# Patient Record
Sex: FEMALE | Race: WHITE | NOT HISPANIC OR LATINO
[De-identification: names, ages, dates, MRNs, and addresses within clinical notes are randomized per-mention and may not be internally consistent; named-entity substitution may affect disease eponyms.]

---

## 2018-01-31 ENCOUNTER — APPOINTMENT (OUTPATIENT)
Dept: OTOLARYNGOLOGY | Facility: CLINIC | Age: 68
End: 2018-01-31

## 2018-03-05 ENCOUNTER — APPOINTMENT (OUTPATIENT)
Dept: OTOLARYNGOLOGY | Facility: CLINIC | Age: 68
End: 2018-03-05
Payer: MEDICARE

## 2018-03-05 VITALS
OXYGEN SATURATION: 100 % | SYSTOLIC BLOOD PRESSURE: 148 MMHG | HEART RATE: 78 BPM | TEMPERATURE: 97.7 F | DIASTOLIC BLOOD PRESSURE: 71 MMHG

## 2018-03-05 PROCEDURE — 31575 DIAGNOSTIC LARYNGOSCOPY: CPT

## 2018-03-05 PROCEDURE — 99214 OFFICE O/P EST MOD 30 MIN: CPT | Mod: 25

## 2018-09-03 ENCOUNTER — FORM ENCOUNTER (OUTPATIENT)
Age: 68
End: 2018-09-03

## 2018-09-04 ENCOUNTER — APPOINTMENT (OUTPATIENT)
Dept: MRI IMAGING | Facility: CLINIC | Age: 68
End: 2018-09-04
Payer: MEDICARE

## 2018-09-04 ENCOUNTER — OUTPATIENT (OUTPATIENT)
Dept: OUTPATIENT SERVICES | Facility: HOSPITAL | Age: 68
LOS: 1 days | End: 2018-09-04

## 2018-09-04 PROCEDURE — 70543 MRI ORBT/FAC/NCK W/O &W/DYE: CPT | Mod: 26

## 2018-09-04 PROCEDURE — 71552 MRI CHEST W/O & W/DYE: CPT | Mod: 26

## 2019-07-30 ENCOUNTER — APPOINTMENT (OUTPATIENT)
Dept: OTOLARYNGOLOGY | Facility: CLINIC | Age: 69
End: 2019-07-30
Payer: MEDICARE

## 2019-07-30 VITALS
SYSTOLIC BLOOD PRESSURE: 177 MMHG | TEMPERATURE: 98 F | OXYGEN SATURATION: 100 % | RESPIRATION RATE: 16 BRPM | DIASTOLIC BLOOD PRESSURE: 80 MMHG | HEART RATE: 85 BPM

## 2019-07-30 DIAGNOSIS — H61.22 IMPACTED CERUMEN, LEFT EAR: ICD-10-CM

## 2019-07-30 PROCEDURE — 99214 OFFICE O/P EST MOD 30 MIN: CPT | Mod: 25

## 2019-07-30 PROCEDURE — 31575 DIAGNOSTIC LARYNGOSCOPY: CPT

## 2019-07-30 PROCEDURE — G0268 REMOVAL OF IMPACTED WAX MD: CPT | Mod: LT

## 2019-07-30 NOTE — CONSULT LETTER
[Please see my note below.] : Please see my note below. [Dear  ___] : Dear  [unfilled], [Consult Letter:] : I had the pleasure of evaluating your patient, [unfilled]. [Sincerely,] : Sincerely, [Consult Closing:] : Thank you very much for allowing me to participate in the care of this patient.  If you have any questions, please do not hesitate to contact me. [Director, [facility]roid [facility]] : Director, [facility] [Barak Wells M.D., FACS, [facility]] : Barak Wells M.D., FACS, [facility] [[facility]] : [facility] [Certified in Neck Ultrasound/ ECNU & AIUM] : Certified in Neck Ultrasound/ ECNU & AIUM [[facility]] : [facility]  [] :  [Otolaryngology/Head & Neck Surgery] : Otolaryngology/Head & Neck Surgery [[facility]] : [facility]

## 2019-07-30 NOTE — PROCEDURE
[Topical Lidocaine] : topical lidocaine [Hoarseness] : hoarseness not clearly evaluated by indirect laryngoscopy [Image(s) Captured] : image(s) captured and filed [Serial Number: ___] : Serial Number: [unfilled] [Flexible Endoscope] : examined with the flexible endoscope [Cerumen Impaction] : Cerumen Impaction [Same] : same as the Pre Op Dx. [] : Removal of Cerumen [Lesion(s)] : no lesions [de-identified] : The nasal septum is minimally deviated to the left. There are no masses or polyps and the nasal mucosa and secretions are normal. The choanae and posterior nasopharynx are normal without masses or drainage. The Eustachian tube orifices appear patent. The pharynx, including the posterior and lateral pharyngeal walls, the vallecula and base of tongue are normal without ulcerations, lesions or masses. The hypopharynx including the pyriform sinuses open well without pooling of secretions, mucosal lesions or masses. The supraglottic larynx including the epiglottis, petiole, glossoepiglottic folds and pharyngoepiglottic folds are normal without mucosal lesions, ulcerations or masses. The glottis reveals normal false vocal folds. The true vocal folds are glistening white, tense and of equal length, without paralysis, having symmetric mobility on adduction and abduction. There is mild chronic lymphedema. There are no mucosal lesions, nodules, cysts, erythroplasia or leukoplakia. The posterior cricoid area has healthy pink mucosa in the interarytenoid area and esophageal inlet. There is drying and mild thickening/edema of the interarytenoid mucosa  suggestive of posterior laryngitis from laryngopharyngeal acid reflux disease. The trachea is clear in the subglottic region without narrowing, deviation or lesions. \par  [de-identified] : GERD, squamous cell cancer of neck [FreeTextEntry1] : Clicking and fullness AD x 2 weeks [FreeTextEntry2] : left ear  [FreeTextEntry6] : cerumen removed AS with suction.  TM is normal.

## 2019-07-30 NOTE — HISTORY OF PRESENT ILLNESS
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   She had a basal cell cancer removed from her left neck ~ 1.5 years ago (Dermalogist is Lenin Mike).  She also was hospitalized in [place] 4 years ago for hypothyroidism after her PCP had lowered her dose for LT4 to 88 mcg.  She recovered after increasing the dose again to Synthroid brand 100 mcg and LT3 5 mcg daily. Now her weight is stable and she feels well on 100 mcg x 3 days and 88 mcgs x 4 days and Cytomel 5 mcg daily.  She still has intermittent TMJ pain. She has improved bone density on Vitamin D3 1,000 IU and exercise. She finished dental implants and is healing well.  She has chronic mild nasal congestion intermittently that often responds to Flonase.   She denies any new neck masses, neck pain or pressure.  She does still have a dry mouth, intermittent dysphonia and mild dysphagia.  She no longer has GERD or reflux of food on gluten free diet.  She no longer has hives.  Her Dermatologist has been treating her skin cancers. Her carotid Doppler is stable and she has not had TIAs on ASA 81 mg daily and Statins. Last January she was hospitalized over night for hypokalemia that was treated.  Her left ear feels full and more HL now and clicking intermittently. She denies tinnitus, vertigo or discharge.  She denies fever or chills.  [de-identified] : Ivonne is a 64 year-old female who was treated in 1996 with a right modified radical neck dissection a total thyroidectomy and bilateral external beam radiation therapy for an unknown primary squamous cell carcinoma. She was found to be hyperthyroid at that time.  In 2012 she was found to have high grade carotid disease on Doppler and is taking ASA 81 mg daily and was also advised to begin statins. [facility] [place] She was hospitalized 2 weeks ago while visiting her daughter in [place] for shortness of breath and a full evaluation including ruling out a myocardial infarction was negative.  A nuclear stress test was normal.  A chest x-ray was clear. A pulmonary embolism was ruled out.  She was started on a nasal steroid spray and that seems to have helped her breathing situation. She sleeps with her dog and the dog does shed.  She has long-standing dry mouth from radiation therapy and occasional mild dysphagia which has been intermittent.  Her weight has been stable on 88 mcg of levothyroxine daily. Her last set of blood tests revealed normal thyroid function.   Her voice is generally not hoarse unless she is over using it. She does have intermittent pyrosis/GERD that has improved with a gluten free diet and Prilosec.\par

## 2019-07-30 NOTE — REASON FOR VISIT
[Subsequent Evaluation] : a subsequent evaluation for [FreeTextEntry2] : squamous cell carcinoma of the right neck and hyperthyroidism s/p total thyroidectomy.  [FreeTextEntry1] : PCP is Dr. Yue Carpio MD

## 2019-07-30 NOTE — DATA REVIEWED
[No studies available for review at this time] : No studies available for review at this time [de-identified] : see HPI

## 2019-08-01 LAB
25(OH)D3 SERPL-MCNC: 37.8 NG/ML
ALBUMIN SERPL ELPH-MCNC: 4.6 G/DL
ALP BLD-CCNC: 99 U/L
ALT SERPL-CCNC: 15 U/L
ANION GAP SERPL CALC-SCNC: 13 MMOL/L
AST SERPL-CCNC: 19 U/L
BASOPHILS # BLD AUTO: 0.07 K/UL
BASOPHILS NFR BLD AUTO: 0.9 %
BILIRUB SERPL-MCNC: 0.8 MG/DL
BUN SERPL-MCNC: 13 MG/DL
CALCIUM SERPL-MCNC: 10.4 MG/DL
CALCIUM SERPL-MCNC: 10.4 MG/DL
CHLORIDE SERPL-SCNC: 99 MMOL/L
CO2 SERPL-SCNC: 28 MMOL/L
CREAT SERPL-MCNC: 0.78 MG/DL
EOSINOPHIL # BLD AUTO: 0.46 K/UL
EOSINOPHIL NFR BLD AUTO: 5.8 %
GLUCOSE SERPL-MCNC: 92 MG/DL
HCT VFR BLD CALC: 47 %
HGB BLD-MCNC: 14.9 G/DL
IMM GRANULOCYTES NFR BLD AUTO: 0.3 %
LYMPHOCYTES # BLD AUTO: 2.86 K/UL
LYMPHOCYTES NFR BLD AUTO: 36.2 %
MAN DIFF?: NORMAL
MCHC RBC-ENTMCNC: 30.4 PG
MCHC RBC-ENTMCNC: 31.7 GM/DL
MCV RBC AUTO: 95.9 FL
MONOCYTES # BLD AUTO: 1.09 K/UL
MONOCYTES NFR BLD AUTO: 13.8 %
NEUTROPHILS # BLD AUTO: 3.39 K/UL
NEUTROPHILS NFR BLD AUTO: 43 %
PARATHYROID HORMONE INTACT: 25 PG/ML
PLATELET # BLD AUTO: 350 K/UL
POTASSIUM SERPL-SCNC: 4.6 MMOL/L
PROT SERPL-MCNC: 7.4 G/DL
RBC # BLD: 4.9 M/UL
RBC # FLD: 13.7 %
SODIUM SERPL-SCNC: 140 MMOL/L
T3FREE SERPL-MCNC: 3.57 PG/ML
T4 FREE SERPL-MCNC: 1.5 NG/DL
TSH SERPL-ACNC: 0.02 UIU/ML
WBC # FLD AUTO: 7.89 K/UL

## 2021-08-31 ENCOUNTER — APPOINTMENT (OUTPATIENT)
Dept: OTOLARYNGOLOGY | Facility: CLINIC | Age: 71
End: 2021-08-31
Payer: MEDICARE

## 2021-08-31 VITALS
HEIGHT: 63 IN | TEMPERATURE: 98.2 F | DIASTOLIC BLOOD PRESSURE: 82 MMHG | HEART RATE: 90 BPM | WEIGHT: 116 LBS | OXYGEN SATURATION: 100 % | BODY MASS INDEX: 20.55 KG/M2 | SYSTOLIC BLOOD PRESSURE: 174 MMHG

## 2021-08-31 DIAGNOSIS — K86.9 DISEASE OF PANCREAS, UNSPECIFIED: ICD-10-CM

## 2021-08-31 PROCEDURE — 31575 DIAGNOSTIC LARYNGOSCOPY: CPT

## 2021-08-31 PROCEDURE — 99215 OFFICE O/P EST HI 40 MIN: CPT | Mod: 25

## 2021-08-31 NOTE — DATA REVIEWED
[No studies available for review at this time] : No studies available for review at this time [de-identified] : see HPI

## 2021-08-31 NOTE — REASON FOR VISIT
[Subsequent Evaluation] : a subsequent evaluation for [FreeTextEntry2] : squamous cell carcinoma of the right neck and hypothyroidism s/p total thyroidectomy, throat clearing and dry cough x 1 year with increased frequency. [FreeTextEntry1] : PCP is Dr. Yue Carpio MD

## 2021-08-31 NOTE — HISTORY OF PRESENT ILLNESS
[de-identified] : Ivonne is a 64 year-old female who was treated in 1996 with a right modified radical neck dissection a total thyroidectomy and bilateral external beam radiation therapy for an unknown primary squamous cell carcinoma. She was found to be hyperthyroid at that time.  In 2012 she was found to have high grade carotid disease on Doppler and is taking ASA 81 mg daily and was also advised to begin statins. [facility] [place] She was hospitalized 2 weeks ago while visiting her daughter in [place] for shortness of breath and a full evaluation including ruling out a myocardial infarction was negative.  A nuclear stress test was normal.  A chest x-ray was clear. A pulmonary embolism was ruled out.  She was started on a nasal steroid spray and that seems to have helped her breathing situation. She sleeps with her dog and the dog does shed.  She has long-standing dry mouth from radiation therapy and occasional mild dysphagia which has been intermittent.  Her weight has been stable on 88 mcg of levothyroxine daily. Her last set of blood tests revealed normal thyroid function.   Her voice is generally not hoarse unless she is over using it. 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   She had a basal cell cancer removed from her left neck ~ 1.5 years ago (Dermalogist is Lenin Mike).  She also was hospitalized in [place] 6 years ago for hypothyroidism after her PCP had lowered her dose for LT4 to 88 mcg.  She recovered after increasing the dose again to Synthroid brand 100 mcg and LT3 5 mcg daily. Her last TSH was again over suppressed this month at .0049  daily on Cytomel 5 mg and levothyroxine 88 mcg x 7 days/wk.  She has osteopenia and takes Vitamin D3 1,000 IU and exercise. She has chronic mild nasal congestion intermittently that often responds to Flonase.  This past year she has been clearing her throat more often and a feeling of a dry cough. GERD has improved with diet and weight loss (10 pounds).  She was found to have a slightly elevated bilirubin and an abdominal US revealed a 1.8 cm rounded lesion in the head of the pancreas.  She denies any new neck masses, neck pain or pressure.  She does still have a dry mouth, intermittent dysphonia and mild dysphagia.   She no longer has hives.  Her Dermatologist has been treating her skin cancers. Her carotid Doppler is stable and she has not had TIAs on ASA 81 mg daily and Statins. denies fever, body aches, cough, cyanosis, chest burning, anosmia or recent known COVID exposures.  All family members at home are well. She has been vaccinated.

## 2021-08-31 NOTE — CONSULT LETTER
[Dear  ___] : Dear  [unfilled], [Consult Letter:] : I had the pleasure of evaluating your patient, [unfilled]. [Please see my note below.] : Please see my note below. [Consult Closing:] : Thank you very much for allowing me to participate in the care of this patient.  If you have any questions, please do not hesitate to contact me. [Sincerely,] : Sincerely, [Barak Wells M.D., FACS, [facility]] : Barak Wells M.D., FACS, [facility] [Director, Center for [facility] Parathyroid [facility]] : Director, [facility] [[facility]] : [facility] [[facility]] : [facility]  [Certified in Neck Ultrasound/ ECNU & AIUM] : Certified in Neck Ultrasound/ ECNU & AIUM [] :  [Otolaryngology/Head & Neck Surgery] : Otolaryngology/Head & Neck Surgery [[facility]] : [facility]

## 2021-08-31 NOTE — PROCEDURE
[Image(s) Captured] : image(s) captured and filed [Hoarseness] : hoarseness not clearly evaluated by indirect laryngoscopy [Topical Lidocaine] : topical lidocaine [Flexible Endoscope] : examined with the flexible endoscope [Serial Number: ___] : Serial Number: [unfilled] [Cerumen Impaction] : Cerumen Impaction [Same] : same as the Pre Op Dx. [] : Removal of Cerumen [Oxymetazoline HCl] : oxymetazoline HCl [Lesion(s)] : no lesions [de-identified] : The nasal septum is minimally deviated to the left. There are no masses or polyps and the nasal mucosa and secretions are normal. The choanae and posterior nasopharynx are normal without masses or drainage. The Eustachian tube orifices appear patent. The pharynx, including the posterior and lateral pharyngeal walls, the vallecula and base of tongue are normal without ulcerations, lesions or masses. The hypopharynx including the pyriform sinuses open well without pooling of secretions, mucosal lesions or masses. The supraglottic larynx including the epiglottis, petiole, glossoepiglottic folds and pharyngoepiglottic folds are normal without mucosal lesions, ulcerations or masses. The glottis reveals normal false vocal folds. The true vocal folds are glistening white, tense and of equal length, without paralysis, having symmetric mobility on adduction and abduction. There is minimal chronic lymphedema. There are no mucosal lesions, nodules, cysts, erythroplasia or leukoplakia. The posterior cricoid area has healthy pink mucosa in the interarytenoid area and esophageal inlet. There is drying and mild thickening/edema of the interarytenoid mucosa  suggestive of posterior laryngitis from laryngopharyngeal acid reflux disease. The trachea is clear in the subglottic region without narrowing, deviation or lesions. \par  [de-identified] : GERD, squamous cell cancer of neck, frequent throat clearing [FreeTextEntry1] : Clicking and fullness AD x 2 weeks [FreeTextEntry2] : left ear  [FreeTextEntry6] : cerumen removed AS with suction.  TM is normal.

## 2021-09-02 DIAGNOSIS — Z09 ENCOUNTER FOR FOLLOW-UP EXAMINATION AFTER COMPLETED TREATMENT FOR CONDITIONS OTHER THAN MALIGNANT NEOPLASM: ICD-10-CM

## 2021-09-02 RX ORDER — LEVOTHYROXINE SODIUM 75 UG/1
75 TABLET ORAL
Qty: 90 | Refills: 3 | Status: ACTIVE | COMMUNITY
Start: 2021-09-02 | End: 1900-01-01

## 2021-09-02 RX ORDER — LIOTHYRONINE SODIUM 5 UG/1
5 TABLET ORAL
Qty: 90 | Refills: 0 | Status: ACTIVE | COMMUNITY
Start: 2021-05-06

## 2022-10-11 ENCOUNTER — APPOINTMENT (OUTPATIENT)
Dept: OTOLARYNGOLOGY | Facility: CLINIC | Age: 72
End: 2022-10-11

## 2022-10-11 VITALS
TEMPERATURE: 98.7 F | BODY MASS INDEX: 21.16 KG/M2 | HEART RATE: 86 BPM | SYSTOLIC BLOOD PRESSURE: 187 MMHG | OXYGEN SATURATION: 100 % | HEIGHT: 62 IN | WEIGHT: 115 LBS | RESPIRATION RATE: 16 BRPM | DIASTOLIC BLOOD PRESSURE: 84 MMHG

## 2022-10-11 DIAGNOSIS — G60.9 HEREDITARY AND IDIOPATHIC NEUROPATHY, UNSPECIFIED: ICD-10-CM

## 2022-10-11 DIAGNOSIS — J06.9 ACUTE UPPER RESPIRATORY INFECTION, UNSPECIFIED: ICD-10-CM

## 2022-10-11 PROCEDURE — 31575 DIAGNOSTIC LARYNGOSCOPY: CPT

## 2022-10-11 PROCEDURE — 99215 OFFICE O/P EST HI 40 MIN: CPT | Mod: 25

## 2022-10-11 NOTE — REASON FOR VISIT
[FreeTextEntry2] : squamous cell carcinoma of the right neck and hypothyroidism s/p total thyroidectomy, throat clearing and dry cough x 1 year with increased frequency. [FreeTextEntry1] : PCP is Dr. Yue Carpio MD

## 2022-10-11 NOTE — HISTORY OF PRESENT ILLNESS
[de-identified] : Ivonne is a 64 year-old female who was treated in 1996 with a right modified radical neck dissection a total thyroidectomy and bilateral external beam radiation therapy for an unknown primary squamous cell carcinoma. She was found to be hyperthyroid at that time.  In 2012 she was found to have high grade carotid disease on Doppler and is taking ASA 81 mg daily and was also advised to begin statins. [facility] [place] She was hospitalized 2 weeks ago while visiting her daughter in [place] for shortness of breath and a full evaluation including ruling out a myocardial infarction was negative.  A nuclear stress test was normal.  A chest x-ray was clear. A pulmonary embolism was ruled out.  She was started on a nasal steroid spray and that seems to have helped her breathing situation. She sleeps with her dog and the dog does shed.  She has long-standing dry mouth from radiation therapy and occasional mild dysphagia which has been intermittent.  Her weight has been stable on 88 mcg of levothyroxine daily. Her last set of blood tests revealed normal thyroid function.   Her voice is generally not hoarse unless she is over using it. 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   She had a basal cell cancer removed from her left neck ~ 2.5 years ago (Dermalogist is Lenin Mike).  She also was hospitalized in [place] 7 years ago for hypothyroidism after her PCP had lowered her dose for LT4 to 88 mcg.  She recovered after increasing the dose now on Synthroid brand 75 mcg alternating with 88 mcg daily. Her last TSH on 6/30/22 was again over suppressed at .0305.  She has osteopenia and takes Vitamin D3 1,000 IU and exercise. She has chronic mild nasal congestion intermittently that often responds to Flonase.  She had a sinus infection ~ 5 days ago but tested negative for COVID. She is clearing her throat excessively now. GERD has improved with diet and weight loss (10 pounds).  She was found to have a slightly elevated bilirubin and an abdominal US revealed a 1.8 cm rounded lesion in the head of the pancreas. She had additional imaging at [facility] and no lesion in the pancreas was identified.  She denies any new neck masses, neck pain or pressure.  She does still have a dry mouth, intermittent dysphonia and mild dysphagia.  She no longer has hives. Her Dermatologist has been treating her skin cancers (basal cell of face). Her carotid Doppler is stable and she has not had TIAs on ASA 81 mg daily and Statins. She has a peripheral neuropathy in her left LE being managed by a neurologist. She denies fever, body aches, cough, cyanosis, chest burning, anosmia or recent known COVID exposures.  All family members at home are well. She has been vaccinated and boosted.

## 2022-10-11 NOTE — PROCEDURE
[Image(s) Captured] : image(s) captured and filed [Unable to Cooperate with Mirror] : patient unable to cooperate with mirror [Gag Reflex] : gag reflex preventing mirror examination [Hoarseness] : hoarseness not clearly evaluated by indirect laryngoscopy [Topical Lidocaine] : topical lidocaine [Oxymetazoline HCl] : oxymetazoline HCl [Flexible Endoscope] : examined with the flexible endoscope [Serial Number: ___] : Serial Number: [unfilled] [Lesion(s)] : no lesions [de-identified] : The nasal septum is minimally deviated to the left. There are no masses or polyps and the nasal mucosa and secretions are normal. The choanae and posterior nasopharynx are normal without masses or drainage. The Eustachian tube orifices appear patent. The pharynx, including the posterior and lateral pharyngeal walls, the vallecula and base of tongue are normal without ulcerations, lesions or masses. The hypopharynx including the pyriform sinuses open well without pooling of secretions, mucosal lesions or masses. The supraglottic larynx including the epiglottis, petiole, glossoepiglottic folds and pharyngoepiglottic folds are normal without mucosal lesions, ulcerations or masses. The glottis reveals normal false vocal folds. The true vocal folds are glistening white, tense and of equal length, without paralysis, having symmetric mobility on adduction and abduction. There is minimal chronic lymphedema. There are no mucosal lesions, nodules, cysts, erythroplasia or leukoplakia. The posterior cricoid area has healthy pink mucosa in the interarytenoid area and esophageal inlet. There is mild thickening of the interarytenoid mucosa suggestive of posterior laryngitis from laryngopharyngeal acid reflux disease. The trachea is clear in the subglottic region without narrowing, deviation or lesions. \par  [de-identified] : GERD, squamous cell cancer of neck, frequent throat clearing

## 2022-10-11 NOTE — DATA REVIEWED
[No studies available for review at this time] : No studies available for review at this time [de-identified] : see HPI

## 2022-10-14 LAB
25(OH)D3 SERPL-MCNC: 42.8 NG/ML
ESTIMATED AVERAGE GLUCOSE: 117 MG/DL
HBA1C MFR BLD HPLC: 5.7 %
T3FREE SERPL-MCNC: 2.21 PG/ML
T4 FREE SERPL-MCNC: 1.8 NG/DL
TSH SERPL-ACNC: 0.16 UIU/ML

## 2023-03-13 ENCOUNTER — APPOINTMENT (OUTPATIENT)
Dept: OTOLARYNGOLOGY | Facility: CLINIC | Age: 73
End: 2023-03-13
Payer: MEDICARE

## 2023-03-13 VITALS
HEART RATE: 83 BPM | TEMPERATURE: 98.1 F | DIASTOLIC BLOOD PRESSURE: 65 MMHG | SYSTOLIC BLOOD PRESSURE: 109 MMHG | RESPIRATION RATE: 16 BRPM | WEIGHT: 111 LBS | OXYGEN SATURATION: 100 % | BODY MASS INDEX: 20.43 KG/M2 | HEIGHT: 62 IN

## 2023-03-13 DIAGNOSIS — T66.XXXS RADIATION SICKNESS, UNSPECIFIED, SEQUELA: ICD-10-CM

## 2023-03-13 DIAGNOSIS — I77.9 DISORDER OF ARTERIES AND ARTERIOLES, UNSPECIFIED: ICD-10-CM

## 2023-03-13 PROCEDURE — 99215 OFFICE O/P EST HI 40 MIN: CPT | Mod: 25

## 2023-03-13 PROCEDURE — 31575 DIAGNOSTIC LARYNGOSCOPY: CPT

## 2023-03-13 RX ORDER — ROSUVASTATIN CALCIUM 20 MG/1
20 TABLET, FILM COATED ORAL
Qty: 90 | Refills: 0 | Status: ACTIVE | COMMUNITY
Start: 2023-02-22

## 2023-03-13 NOTE — CONSULT LETTER
[Dear  ___] : Dear  [unfilled], [Consult Letter:] : I had the pleasure of evaluating your patient, [unfilled]. [Please see my note below.] : Please see my note below. [Consult Closing:] : Thank you very much for allowing me to participate in the care of this patient.  If you have any questions, please do not hesitate to contact me. [Sincerely,] : Sincerely, [Barak Wells M.D., FACS, [facility]] : Barak Wells M.D., FACS, [facility] [Director, Center [facility] Parathyroid [facility]] : Director, [facility] [[facility]] : [facility] [[facility]] : [facility]  [Certified in Neck Ultrasound/ ECNU & AIUM] : Certified in Neck Ultrasound/ ECNU & AIUM [] :  [Otolaryngology/Head & Neck Surgery] : Otolaryngology/Head & Neck Surgery [[facility]] : [facility] [DrZhane  ___] : Dr. HINTON

## 2023-03-13 NOTE — PROCEDURE
[Image(s) Captured] : image(s) captured and filed [Unable to Cooperate with Mirror] : patient unable to cooperate with mirror [Gag Reflex] : gag reflex preventing mirror examination [Hoarseness] : hoarseness not clearly evaluated by indirect laryngoscopy [Topical Lidocaine] : topical lidocaine [Oxymetazoline HCl] : oxymetazoline HCl [Flexible Endoscope] : examined with the flexible endoscope [Serial Number: ___] : Serial Number: [unfilled] [Lesion(s)] : no lesions [de-identified] : The nasal septum is minimally deviated to the left. There are no masses or polyps and the nasal mucosa and secretions are normal. The choanae and posterior nasopharynx are normal without masses or drainage. The Eustachian tube orifices appear patent. The pharynx, including the posterior and lateral pharyngeal walls, the vallecula and base of tongue are normal without ulcerations, lesions or masses. The hypopharynx including the pyriform sinuses open well without pooling of secretions, mucosal lesions or masses. The supraglottic larynx including the epiglottis, petiole, glossoepiglottic folds and pharyngoepiglottic folds are normal without mucosal lesions, ulcerations or masses. The glottis reveals normal false vocal folds. The true vocal folds are glistening white, tense and of equal length, without paralysis, having symmetric mobility on adduction and abduction. There is mild chronic lymphedema. There are no mucosal lesions, nodules, cysts, erythroplasia or leukoplakia. The posterior cricoid area has healthy pink mucosa in the interarytenoid area and esophageal inlet. There is mild thickening of the interarytenoid mucosa suggestive of posterior laryngitis from laryngopharyngeal acid reflux disease. The trachea is clear in the subglottic region without narrowing, deviation or lesions. \par  [de-identified] : GERD, squamous cell cancer of neck, frequent throat clearing

## 2023-03-13 NOTE — HISTORY OF PRESENT ILLNESS
[de-identified] : Ivonne is a 64 year-old female who was treated in 1996 with a right modified radical neck dissection a total thyroidectomy and bilateral external beam radiation therapy for an unknown primary squamous cell carcinoma. She was found to be hyperthyroid at that time.  In 2012 she was found to have high grade carotid disease on Doppler and is taking ASA 81 mg daily and was also advised to begin statins. [facility] [place]. She was hospitalized 2 weeks ago while visiting her daughter in [place] for shortness of breath and a full evaluation including ruling out a myocardial infarction was negative.  A nuclear stress test was normal.  A chest x-ray was clear. A pulmonary embolism was ruled out.  She was started on a nasal steroid spray and that seems to have helped her breathing situation. She sleeps with her dog and the dog does shed.  She has long-standing dry mouth from radiation therapy and occasional mild dysphagia which has been intermittent.  Her weight has been stable on 88 mcg of levothyroxine daily. Her last set of blood tests revealed normal thyroid function.   Her voice is generally not hoarse unless she is over using it. 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   She had a basal cell cancer removed from her left neck ~ 2.5 years ago (Dermalogist is Lenin Mike).  She also was hospitalized in [place] 7 years ago for hypothyroidism after her PCP had lowered her dose for LT4 to 88 mcg.  She recovered after increasing the dose now on Synthroid brand 75 mcg alternating with 88 mcg daily. Her last TSH on 6/30/22 was again over suppressed at .0305.  She has osteopenia and takes Vitamin D3 1,000 IU and exercise. She has chronic mild nasal congestion intermittently that often responds to Flonase.  She had a sinus infection ~ 5 days ago but tested negative for COVID. She is clearing her throat excessively now. GERD has improved with diet and weight loss (10 pounds).  She was found to have a slightly elevated bilirubin and an abdominal US revealed a 1.8 cm rounded lesion in the head of the pancreas. She had additional imaging at [facility] and no lesion in the pancreas was identified.  She denies any new neck masses, neck pain or pressure.  She does still have a dry mouth, intermittent dysphonia and mild dysphagia.  She no longer has hives. Her Dermatologist has been treating her skin cancers (basal cell of face). Her carotid Doppler is stable and she has not had TIAs on ASA 81 mg daily and Statins. She has a peripheral neuropathy in her left LE being managed by a neurologist. She denies fever, body aches, cough, cyanosis, chest burning, anosmia or recent known COVID exposures.  All family members at home are well. She has been vaccinated and boosted. Her interval history involves the finding on a CXR last year to have calcifications in the aorta and then saw a cardiologist who did a carotid Doppler that demonstrated a major stenosis (report is not available).  He recommended a f/u in 6 months. She denies syncope or TIAs. She is now on 81 mg ASA and increased her statin dose.  She has not had any major infections. denies fever, body aches, cough, cyanosis, chest burning, anosmia or recent known COVID exposures.  All family members at home are well. She is vaccinated and boosted.

## 2023-03-13 NOTE — DATA REVIEWED
[No studies available for review at this time] : No studies available for review at this time [de-identified] : see HPI

## 2023-03-14 ENCOUNTER — APPOINTMENT (OUTPATIENT)
Dept: CT IMAGING | Facility: HOSPITAL | Age: 73
End: 2023-03-14

## 2023-03-18 ENCOUNTER — OUTPATIENT (OUTPATIENT)
Dept: OUTPATIENT SERVICES | Facility: HOSPITAL | Age: 73
LOS: 1 days | End: 2023-03-18
Payer: MEDICARE

## 2023-03-18 ENCOUNTER — APPOINTMENT (OUTPATIENT)
Dept: CT IMAGING | Facility: HOSPITAL | Age: 73
End: 2023-03-18

## 2023-03-18 LAB — POCT ISTAT CREATININE: 1.1 MG/DL — SIGNIFICANT CHANGE UP (ref 0.5–1.3)

## 2023-03-18 PROCEDURE — 70498 CT ANGIOGRAPHY NECK: CPT

## 2023-03-18 PROCEDURE — 82565 ASSAY OF CREATININE: CPT

## 2023-03-18 PROCEDURE — 70498 CT ANGIOGRAPHY NECK: CPT | Mod: 26,MH

## 2023-03-29 ENCOUNTER — APPOINTMENT (OUTPATIENT)
Dept: VASCULAR SURGERY | Facility: CLINIC | Age: 73
End: 2023-03-29
Payer: MEDICARE

## 2023-03-29 VITALS
SYSTOLIC BLOOD PRESSURE: 135 MMHG | BODY MASS INDEX: 18.96 KG/M2 | HEIGHT: 63 IN | WEIGHT: 107 LBS | DIASTOLIC BLOOD PRESSURE: 80 MMHG | HEART RATE: 84 BPM

## 2023-03-29 PROCEDURE — 93880 EXTRACRANIAL BILAT STUDY: CPT

## 2023-03-29 PROCEDURE — 99203 OFFICE O/P NEW LOW 30 MIN: CPT

## 2023-03-29 RX ORDER — AMOXICILLIN 500 MG/1
500 CAPSULE ORAL
Qty: 16 | Refills: 0 | Status: DISCONTINUED | COMMUNITY
Start: 2018-01-23 | End: 2023-03-29

## 2023-03-29 RX ORDER — VITAMIN B COMPLEX
CAPSULE ORAL
Refills: 0 | Status: ACTIVE | COMMUNITY

## 2023-03-29 RX ORDER — MAGNESIUM OXIDE/MAG AA CHELATE 300 MG
CAPSULE ORAL
Refills: 0 | Status: ACTIVE | COMMUNITY

## 2023-03-29 RX ORDER — ATORVASTATIN CALCIUM 10 MG/1
10 TABLET, FILM COATED ORAL
Refills: 0 | Status: DISCONTINUED | COMMUNITY
End: 2023-03-29

## 2023-03-29 RX ORDER — LEVOTHYROXINE SODIUM 0.07 MG/1
75 TABLET ORAL
Qty: 90 | Refills: 3 | Status: DISCONTINUED | COMMUNITY
Start: 2021-08-31 | End: 2023-03-29

## 2023-03-29 RX ORDER — PNV NO.95/FERROUS FUM/FOLIC AC 28MG-0.8MG
TABLET ORAL
Refills: 0 | Status: ACTIVE | COMMUNITY

## 2023-03-29 RX ORDER — ACETAMINOPHEN 325 MG
TABLET ORAL
Refills: 0 | Status: ACTIVE | COMMUNITY

## 2023-04-25 NOTE — ASSESSMENT
[Arterial/Venous Disease] : arterial/venous disease [FreeTextEntry1] : 72 y/o F w/ asymptomatic R occluded ICA, L mild ICA stenosis and L subclavian artery stenosis. R eye arterial bleed with partial vision impairment.\par On exam, no focal neurological deficits, no carotid bruits, no heart murmurs. Bilateral radial pulses equal. \par I reviewed last CTA on file and carotid duplex today showed R ICA occluded, L ICA less than 50% stenosis. Also, there is evidence of L SCA stenosis. \par Findings discussed w/ pt and recommend no surgical interventions. Pt advised to continue ASA/statin daily. Dr. Puentes will communicate with Dr. Yue Carpio at [facility] and Dr. Cuca Avalos.  F/u in 1 year.   [Medication Management] : medication management

## 2023-04-25 NOTE — PROCEDURE
[FreeTextEntry1] : Carotid duplex ordered to eval stenosis, shows: R ICA occluded, L ICA less than 50% stenosis.

## 2023-04-25 NOTE — PHYSICAL EXAM
[Respiratory Effort] : normal respiratory effort [Normal Rate and Rhythm] : normal rate and rhythm [2+] : left 2+ [No Rash or Lesion] : No rash or lesion [Alert] : alert [Oriented to Person] : oriented to person [Oriented to Place] : oriented to place [Oriented to Time] : oriented to time [Calm] : calm [Right Carotid Bruit] : no bruit heard over the right carotid [Left Carotid Bruit] : no bruit heard over the left carotid [de-identified] : WN/WD [FreeTextEntry1] : no bruits, no murmurs [de-identified] : FROM

## 2023-04-25 NOTE — DATA REVIEWED
[FreeTextEntry1] : CTA Neck 3/18/23: occ R ICA distal to bifurcation. R prox CCA mod to sev stenosis. L subclavian severe stenosis distal to L vertebral artery.

## 2023-04-25 NOTE — HISTORY OF PRESENT ILLNESS
[FreeTextEntry1] : 74 y/o F referred by Dr Wells. 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 She is on ASA/statin. \par \par Today, she presents for an evaluation of her carotid arteries, which she had previously been informed by another physician the right side was 90% occluded. Though significant occlusion was seen by the previous physician, patient was informed that because she was asymptomatic no interventional steps needed to be pursued.  However, patient reports having lost peripheral vision in her right eye 4-5 years ago due to a bursted blood vessel leading to the optic nerve. Denies any one-sided weakness.  he reports recent trouble finding words when anxious, however no facial droopiness, aphasia, or dysarthria. Denies any headaches, however reports recent occasional R sided head "twitches". SHe explains a month ago, she had a chest x-ray and there was severe aortic calcifications which prompted further studies to be done and the R carotid was noted now to be 100% occluded. \par \par SHx:\par -Former smoker\par -Retired\par \par FMHx:\par -Mother: hx of TIA's, Dementia, DVTs, Cdiff, passed at age 86\par -Father: hx of T2DM/HTN, passed at age 91\par -2 Brothers: hx of obesity and bladder CA\par -1 Sister: hx of obesity

## 2023-04-25 NOTE — ADDENDUM
[FreeTextEntry1] : I, Dr. Ross Puentes, personally performed the evaluation and management (E/M) services for this new patient.  That E/M includes conducting the initial examination, assessing all conditions, and establishing the plan of care.  Today, my ACP, Michelle Domínguez NP, was here to observe my evaluation and management services for this patient to be followed going forward. \par \par The documentation for this encounter was entered by Fe Griffiths acting as a scribe for Dr. Ross Puentes.\par

## 2024-05-07 ENCOUNTER — APPOINTMENT (OUTPATIENT)
Dept: OTOLARYNGOLOGY | Facility: CLINIC | Age: 74
End: 2024-05-07
Payer: MEDICARE

## 2024-05-07 VITALS
SYSTOLIC BLOOD PRESSURE: 152 MMHG | HEART RATE: 84 BPM | DIASTOLIC BLOOD PRESSURE: 84 MMHG | WEIGHT: 111 LBS | HEIGHT: 63 IN | TEMPERATURE: 98.3 F | BODY MASS INDEX: 19.67 KG/M2 | OXYGEN SATURATION: 99 %

## 2024-05-07 DIAGNOSIS — E89.0 POSTPROCEDURAL HYPOTHYROIDISM: ICD-10-CM

## 2024-05-07 DIAGNOSIS — C76.0 MALIGNANT NEOPLASM OF HEAD, FACE AND NECK: ICD-10-CM

## 2024-05-07 DIAGNOSIS — R09.89 OTHER SPECIFIED SYMPTOMS AND SIGNS INVOLVING THE CIRCULATORY AND RESPIRATORY SYSTEMS: ICD-10-CM

## 2024-05-07 DIAGNOSIS — R13.19 OTHER DYSPHAGIA: ICD-10-CM

## 2024-05-07 DIAGNOSIS — K11.7 DISTURBANCES OF SALIVARY SECRETION: ICD-10-CM

## 2024-05-07 DIAGNOSIS — K21.9 GASTRO-ESOPHAGEAL REFLUX DISEASE W/OUT ESOPHAGITIS: ICD-10-CM

## 2024-05-07 PROCEDURE — 31575 DIAGNOSTIC LARYNGOSCOPY: CPT

## 2024-05-07 PROCEDURE — 99215 OFFICE O/P EST HI 40 MIN: CPT | Mod: 25

## 2024-05-07 NOTE — DATA REVIEWED
[No studies available for review at this time] : No studies available for review at this time [de-identified] : see HPI [de-identified] : all chart notes reviewed

## 2024-05-07 NOTE — HISTORY OF PRESENT ILLNESS
[de-identified] : Ivonne is a 64 year-old female who was treated in 1996 with a right modified radical neck dissection a total thyroidectomy and bilateral external beam radiation therapy for an unknown primary squamous cell carcinoma. She was found to be hyperthyroid at that time.  In 2012 she was found to have high grade carotid disease on Doppler and is taking ASA 81 mg daily and was also advised to begin statins. She was referred to Dr. Avalos for [facility] evaluation at [place]. She was hospitalized 2 weeks ago while visiting her daughter in [place] for shortness of breath and a full evaluation including ruling out a myocardial infarction was negative.  A nuclear stress test was normal.  A chest x-ray was clear. A pulmonary embolism was ruled out.  She was started on a nasal steroid spray and that seems to have helped her breathing situation. She sleeps with her dog and the dog does shed.  She has long-standing dry mouth from radiation therapy and occasional mild dysphagia which has been intermittent.  Her weight has been stable on 88 mcg of levothyroxine daily. Her last set of blood tests revealed normal thyroid function.   Her voice is generally not hoarse unless she is overusing it. She does have intermittent pyrosis/GERD that has improved with a gluten free diet and Prilosec. -------------------------------------------------------------------------------------------------------------------------------------------------------------------------------------   [FreeTextEntry1] : Avila is a 74 year-old female being monitored for a previously treated squamous cell ca from an unknown primary squamous cell carcinoma in the right neck with MRND and EBRT in April 1996.   She had a basal cell cancer removed from her left neck ~ 3.5 years ago (Dermatologist is Lenin Mike).  She also was hospitalized in [place] 8 years ago for hypothyroidism after her PCP had lowered her dose for LT4 to 88 mcg.  She recovered after increasing the dose now on Synthroid brand 75 mcgs x 6 days and Liothyronine 5 mcgs 7 days a week. Her last TSH on 4/11/24 was in the low normal range at 0.887.  She has osteopenia and takes Vitamin D3 1,000 IU and exercise. She has chronic mild nasal congestion intermittently that often responds to Flonase.  She is still clearing her throat excessively now.  Her mucus remains thickened especially worse when dehydrated.  GERD has improved with diet and weight loss (10 pounds).  She was found to have a slightly elevated bilirubin and an abdominal US revealed a 1.8 cm rounded lesion in the head of the pancreas. She had additional imaging [place] [facility] and no lesion in the pancreas was identified.  She denies any new neck masses, neck pain or pressure.  She does still have a dry mouth, intermittent dysphonia and mild dysphagia.  She no longer has hives. Her Dermatologist has been treating her skin cancers (basal cell of face). Her carotid Doppler is stable, and she has not had TIAs on ASA 81 mg daily and Statins. She has a peripheral neuropathy in her left LE being managed by a neurologist.  Her most recent Doppler ultrasound of the neck revealed moderate heterogeneous plaque along the common carotid artery on the left and moderate plaque noted on common carotid artery on the right.  MRA of the neck with and without contrast was obtained demonstrating a normal appearing right common carotid artery without any flow detected within the right internal carotid artery the left carotid artery reveals mild to moderate stenosis affecting the left mid internal carotid artery she denies fever, body aches, cough, cyanosis, chest burning, anosmia or recent known COVID exposures.  All family members at home are well. She has been vaccinated and boosted. Her interval history involves the finding on a CXR last year to have calcifications in the aorta and then saw a cardiologist who did a carotid Doppler that demonstrated a major stenosis.  He recommended a f/u in 6 months. She denies syncope or TIAs. She is now on 81 mg ASA and increased her statin dose.  She has not had any major infections. denies fever, body aches, cough, cyanosis, chest burning, anosmia or recent known COVID exposures.  All family members at home are well. She is vaccinated and boosted.

## 2024-05-07 NOTE — PROCEDURE
[Image(s) Captured] : image(s) captured and filed [Unable to Cooperate with Mirror] : patient unable to cooperate with mirror [Gag Reflex] : gag reflex preventing mirror examination [Hoarseness] : hoarseness not clearly evaluated by indirect laryngoscopy [Topical Lidocaine] : topical lidocaine [Oxymetazoline HCl] : oxymetazoline HCl [Flexible Endoscope] : examined with the flexible endoscope [Serial Number: ___] : Serial Number: [unfilled] [Lesion(s)] : no lesions [de-identified] : Verbal informed consent was obtained for fiber optic laryngoscopy.  Findings: The nasal septum is minimally deviated to the left. There are no masses or polyps, and the nasal mucosa and secretions are normal. The choanae and posterior nasopharynx are normal without masses or drainage. The Eustachian tube orifices appear patent. The pharynx, including the posterior and lateral pharyngeal walls, the vallecula and base of tongue are normal without ulcerations, lesions or masses. The hypopharynx including the pyriform sinuses open well without pooling of secretions, mucosal lesions or masses. The supraglottic larynx including the epiglottis, petiole, glossoepiglottic folds and pharyngoepiglottic folds are normal without mucosal lesions, ulcerations or masses. The glottis reveals normal false vocal folds. The true vocal folds are glistening white, tense and of equal length, without paralysis, having symmetric mobility on adduction and abduction. There is mild chronic lymphedema. There are no mucosal lesions, nodules, cysts, erythroplasia or leukoplakia. The posterior cricoid area has healthy pink mucosa in the interarytenoid area and esophageal inlet. There is slight thickening of the interarytenoid mucosa suggestive of posterior laryngitis from laryngopharyngeal acid reflux disease. The trachea is clear in the subglottic region without narrowing, deviation or lesions.   [de-identified] : GERD, squamous cell cancer of neck, frequent throat clearing

## 2024-05-07 NOTE — CONSULT LETTER
[Dear  ___] : Dear  [unfilled], [Consult Letter:] : I had the pleasure of evaluating your patient, [unfilled]. [Please see my note below.] : Please see my note below. [Consult Closing:] : Thank you very much for allowing me to participate in the care of this patient.  If you have any questions, please do not hesitate to contact me. [Sincerely,] : Sincerely, [DrZhane  ___] : Dr. HINTON [Barak Wells M.D., FACS, [facility]IMEE] : Barak Wells M.D., FACS, [facility] [Director, [facility]] : Director, [facility] [[facility]] : [facility] [[facility]] : [facility]  [Certified in Neck Ultrasound/ ECNU & AIUM] : Certified in Neck Ultrasound/ ECNU & AIUM [] :  [Otolaryngology/Head & Neck Surgery] : Otolaryngology/Head & Neck Surgery [[facility]] : [facility] [FreeTextEntry3] :  Barak Wells M.D., FACS, ECNU Director [facility] at [facility] Certified in Thyroid/Parathyroid/Neck Ultrasound, ENRIQUETA/ JOSE , Department of Otolaryngology [facility] at [facility]

## 2024-05-29 ENCOUNTER — APPOINTMENT (OUTPATIENT)
Dept: VASCULAR SURGERY | Facility: CLINIC | Age: 74
End: 2024-05-29
Payer: MEDICARE

## 2024-05-29 VITALS
HEIGHT: 63 IN | SYSTOLIC BLOOD PRESSURE: 148 MMHG | BODY MASS INDEX: 19.67 KG/M2 | DIASTOLIC BLOOD PRESSURE: 83 MMHG | WEIGHT: 111 LBS | HEART RATE: 87 BPM

## 2024-05-29 DIAGNOSIS — Z87.891 PERSONAL HISTORY OF NICOTINE DEPENDENCE: ICD-10-CM

## 2024-05-29 DIAGNOSIS — I65.21 OCCLUSION AND STENOSIS OF RIGHT CAROTID ARTERY: ICD-10-CM

## 2024-05-29 DIAGNOSIS — I65.22 OCCLUSION AND STENOSIS OF LEFT CAROTID ARTERY: ICD-10-CM

## 2024-05-29 PROCEDURE — 99213 OFFICE O/P EST LOW 20 MIN: CPT

## 2024-06-05 NOTE — PHYSICAL EXAM
[Respiratory Effort] : normal respiratory effort [Normal Rate and Rhythm] : normal rate and rhythm [No Rash or Lesion] : No rash or lesion [Alert] : alert [Oriented to Person] : oriented to person [Oriented to Place] : oriented to place [Oriented to Time] : oriented to time [Calm] : calm [2+] : right 2+ [1+] : left 1+ [Right Carotid Bruit] : no bruit heard over the right carotid [Left Carotid Bruit] : no bruit heard over the left carotid [de-identified] : WN/WD [FreeTextEntry1] : No focal neurological deficits, no bruits, no murmurs, L radial pulse weaker than R.  [de-identified] : FROM

## 2024-06-05 NOTE — ASSESSMENT
[Arterial/Venous Disease] : arterial/venous disease [Medication Management] : medication management [FreeTextEntry1] : 73 y/o F w/ asymptomatic R occluded ICA, L mild ICA stenosis and L subclavian artery stenosis. R eye arterial bleed with partial vision impairment.  On exam, no focal neurological deficits, no bruits, no murmurs, L radial pulse weaker than R. /83, HR 87. Carotid duplex from outside reviewed as well has MR reports. Spot check in the office consistent with our previous duplex, R ICA occluded and L ICA w/ <50% stenosis.  Findings discussed w/ pt and recommend no surgical interventions. I reviewed the most recent US and the MRA shows right side open and left says occluded, however according to in-office carotid duplex, MRA findings are wrong and should demonstrate total occlusion right side and left mild stenosis. Pt advised to continue ASA/statin daily. Recommended to follow up with us in 6 months and duplex to be done in our office.

## 2024-06-05 NOTE — ADDENDUM
[FreeTextEntry1] : I, Dr. George Puentes, personally performed the evaluation and management (E/M) services for this established patient who presents today with (a) new problem(s)/exacerbation of (an) existing condition(s).  That E/M includes conducting the examination, assessing all new/exacerbated conditions, and establishing a new plan of care.  Today, my ACP, Michelle Domínguez NP, was here to observe my evaluation and management services for this new problem/exacerbated condition to be followed going forward.  Documented by Vinicius Villaseñor acting as a Scribe for GEORGE PUENTES on 05/29/2024.   All medical record entries made by the Scribe were at my direction and personally dictated by me, GEORGE PUENTES, on 05/29/2024. I have reviewed the chart and agree that the record accurately reflects my personal performances of the history, physical exam, assessment and plan. I have also personally directed, reviewed, and agree with the discharge instructions.

## 2024-06-05 NOTE — HISTORY OF PRESENT ILLNESS
[FreeTextEntry1] : 73 y/o F who was originally referred by Dr Wells. 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   Today, she presents for yearly f/u appointment. She reports feeling well overall. She brings some reports of outside carotid duplex and a neck MRA. She had these done recently at another office and is concerned. Denies any neurological symptoms. She is on ASA/statin.   SHx: -Former smoker -Retired  FMHx: -Mother: hx of TIA's, Dementia, DVTs, Cdiff, passed at age 86 -Father: hx of T2DM/HTN, passed at age 91 -2 Brothers: hx of obesity and bladder CA -1 Sister: hx of obesity

## 2024-06-05 NOTE — CONSULT LETTER
[Dear  ___] : Dear  [unfilled], [FreeTextEntry2] : Barak Wells  [address] [place] [address]  Yue Carpio MD [address] [place] [address]  Irma Liu MD [address] [place] [address] [FreeTextEntry1] : I saw Ms Avila Yancey in my office today for carotid follow up. I saw her in March 2023 and she had evidence of chronically occluded right carotid, left-sided mild carotid stenosis and left subclavian artery stenosis. I recommended that she see us in 1 year and continue taking the aspirin and statin. She has a history of right eye arterial bleed, 4-5 years ago with partial vision loss. Today, she denies any neurological symptoms but is worried given outsides studies done showed worrisome findings on the carotid arteries.   On exam, no carotid bruits, cardiac murmurs or focal neurological deficits. Blood pressure is 148/83, heart rate 87 b/min.   I reviewed outside studies which were not consistent with findings on our previous carotid duplex findings. Using ultrasound probe in the office, I confirmed the right side is occluded and the left side has mild stenosis.    I had a long discussion with Ms Yancey.  There is no doubt that she has an occluded right internal carotid artery and only a mild stenosis of the left carotid artery.  No intervention is warranted.  She should remain on the statin along with a daily aspirin. I encouraged her to stay active.    Please see my full EMR note below. [FreeTextEntry3] : Sincerely,   Ross Puentes M.D.  , Surgical Services [facility] Surgeon-in-Chief, [facility] Professor of Surgery, [employer] at [facility]

## 2024-10-09 ENCOUNTER — APPOINTMENT (OUTPATIENT)
Dept: GYNECOLOGIC ONCOLOGY | Facility: CLINIC | Age: 74
End: 2024-10-09
Payer: MEDICARE

## 2024-10-09 ENCOUNTER — NON-APPOINTMENT (OUTPATIENT)
Age: 74
End: 2024-10-09

## 2024-10-09 VITALS
WEIGHT: 114 LBS | SYSTOLIC BLOOD PRESSURE: 118 MMHG | DIASTOLIC BLOOD PRESSURE: 70 MMHG | HEART RATE: 71 BPM | HEIGHT: 63 IN | BODY MASS INDEX: 20.2 KG/M2 | TEMPERATURE: 97.3 F | OXYGEN SATURATION: 95 %

## 2024-10-09 DIAGNOSIS — N84.0 POLYP OF CORPUS UTERI: ICD-10-CM

## 2024-10-09 PROCEDURE — 99459 PELVIC EXAMINATION: CPT

## 2024-10-09 PROCEDURE — 99205 OFFICE O/P NEW HI 60 MIN: CPT

## 2024-10-09 RX ORDER — MISOPROSTOL 200 UG/1
200 TABLET ORAL
Qty: 4 | Refills: 0 | Status: ACTIVE | COMMUNITY
Start: 2024-10-09 | End: 1900-01-01

## 2024-11-20 ENCOUNTER — APPOINTMENT (OUTPATIENT)
Dept: VASCULAR SURGERY | Facility: CLINIC | Age: 74
End: 2024-11-20
Payer: MEDICARE

## 2024-11-20 VITALS — SYSTOLIC BLOOD PRESSURE: 113 MMHG | DIASTOLIC BLOOD PRESSURE: 69 MMHG | HEART RATE: 94 BPM

## 2024-11-20 DIAGNOSIS — Z87.891 PERSONAL HISTORY OF NICOTINE DEPENDENCE: ICD-10-CM

## 2024-11-20 DIAGNOSIS — I65.21 OCCLUSION AND STENOSIS OF RIGHT CAROTID ARTERY: ICD-10-CM

## 2024-11-20 DIAGNOSIS — I65.22 OCCLUSION AND STENOSIS OF LEFT CAROTID ARTERY: ICD-10-CM

## 2024-11-20 PROCEDURE — 99213 OFFICE O/P EST LOW 20 MIN: CPT

## 2024-11-20 PROCEDURE — 93880 EXTRACRANIAL BILAT STUDY: CPT

## 2024-12-12 NOTE — ASU PATIENT PROFILE, ADULT - NSICDXPASTMEDICALHX_GEN_ALL_CORE_FT
PAST MEDICAL HISTORY:  ADD (attention deficit disorder)     CAD (coronary artery disease)     H/O carotid artery stenosis right side compeltely occluded, left side partially occluded    Head and neck cancer     Hypothyroid     Skin cancer, basal cell

## 2024-12-12 NOTE — ASU PATIENT PROFILE, ADULT - NSICDXPASTSURGICALHX_GEN_ALL_CORE_FT
PAST SURGICAL HISTORY:  H/O abdominoplasty     H/O breast surgery     H/O cosmetic surgery face    H/O hand surgery right    H/O neck dissection     H/O rhinoplasty     H/O thyroidectomy

## 2024-12-12 NOTE — ASU PATIENT PROFILE, ADULT - NS PREOP UNDERSTANDS INFO
No solid food for 8 hours before surgery/ no chewing or hard candy   while fasting. wear loose fitting comfortable clothes/yes

## 2024-12-13 ENCOUNTER — APPOINTMENT (OUTPATIENT)
Dept: GYNECOLOGIC ONCOLOGY | Facility: AMBULATORY SURGERY CENTER | Age: 74
End: 2024-12-13

## 2024-12-13 ENCOUNTER — TRANSCRIPTION ENCOUNTER (OUTPATIENT)
Age: 74
End: 2024-12-13

## 2024-12-13 ENCOUNTER — OUTPATIENT (OUTPATIENT)
Dept: OUTPATIENT SERVICES | Facility: HOSPITAL | Age: 74
LOS: 1 days | Discharge: HOME | End: 2024-12-13
Payer: MEDICARE

## 2024-12-13 ENCOUNTER — RESULT REVIEW (OUTPATIENT)
Age: 74
End: 2024-12-13

## 2024-12-13 VITALS
DIASTOLIC BLOOD PRESSURE: 64 MMHG | WEIGHT: 110.23 LBS | TEMPERATURE: 98 F | OXYGEN SATURATION: 100 % | RESPIRATION RATE: 16 BRPM | HEIGHT: 63 IN | SYSTOLIC BLOOD PRESSURE: 118 MMHG | HEART RATE: 86 BPM

## 2024-12-13 VITALS
SYSTOLIC BLOOD PRESSURE: 120 MMHG | OXYGEN SATURATION: 98 % | TEMPERATURE: 97 F | HEART RATE: 85 BPM | RESPIRATION RATE: 19 BRPM | DIASTOLIC BLOOD PRESSURE: 81 MMHG

## 2024-12-13 DIAGNOSIS — Z98.890 OTHER SPECIFIED POSTPROCEDURAL STATES: Chronic | ICD-10-CM

## 2024-12-13 DIAGNOSIS — E89.0 POSTPROCEDURAL HYPOTHYROIDISM: Chronic | ICD-10-CM

## 2024-12-13 PROCEDURE — 58558 HYSTEROSCOPY BIOPSY: CPT

## 2024-12-13 PROCEDURE — 88305 TISSUE EXAM BY PATHOLOGIST: CPT | Mod: 26

## 2024-12-13 DEVICE — AVETA SMOL RESECTING DEVICE 2.9MM: Type: IMPLANTABLE DEVICE | Status: FUNCTIONAL

## 2024-12-13 RX ORDER — LIOTHYRONINE SODIUM 25 MCG
1 TABLET ORAL
Refills: 0 | DISCHARGE

## 2024-12-13 RX ORDER — ESTRADIOL 0.05MG/24H
10 PATCH, TRANSDERMAL SEMIWEEKLY TRANSDERMAL
Refills: 0 | DISCHARGE

## 2024-12-13 RX ORDER — LEVOTHYROXINE SODIUM 150 MCG
1 TABLET ORAL
Refills: 0 | DISCHARGE

## 2024-12-13 RX ORDER — VALACYCLOVIR HYDROCHLORIDE 1 G/1
1 TABLET, FILM COATED ORAL
Refills: 0 | DISCHARGE

## 2024-12-13 RX ORDER — HYDROCHLOROTHIAZIDE 50 MG
1 TABLET ORAL
Refills: 0 | DISCHARGE

## 2024-12-13 RX ORDER — 0.9 % SODIUM CHLORIDE 0.9 %
1000 INTRAVENOUS SOLUTION INTRAVENOUS
Refills: 0 | Status: DISCONTINUED | OUTPATIENT
Start: 2024-12-13 | End: 2024-12-13

## 2024-12-13 RX ORDER — ACETAMINOPHEN 500MG 500 MG/1
650 TABLET, COATED ORAL ONCE
Refills: 0 | Status: DISCONTINUED | OUTPATIENT
Start: 2024-12-13 | End: 2024-12-13

## 2024-12-13 RX ORDER — ACETAMINOPHEN 500MG 500 MG/1
1000 TABLET, COATED ORAL ONCE
Refills: 0 | Status: COMPLETED | OUTPATIENT
Start: 2024-12-13 | End: 2024-12-13

## 2024-12-13 RX ORDER — ONDANSETRON HYDROCHLORIDE 4 MG/1
4 TABLET, FILM COATED ORAL ONCE
Refills: 0 | Status: DISCONTINUED | OUTPATIENT
Start: 2024-12-13 | End: 2024-12-13

## 2024-12-13 RX ORDER — OXYCODONE HYDROCHLORIDE 30 MG/1
5 TABLET ORAL ONCE
Refills: 0 | Status: DISCONTINUED | OUTPATIENT
Start: 2024-12-13 | End: 2024-12-13

## 2024-12-13 RX ORDER — ROSUVASTATIN CALCIUM 5 MG/1
1 TABLET, FILM COATED ORAL
Refills: 0 | DISCHARGE

## 2024-12-13 NOTE — ASU DISCHARGE PLAN (ADULT/PEDIATRIC) - CARE PROVIDER_API CALL
Ines Vitale  Gynecologic Oncology  [address]  [place] [address]  Phone: [phone]  Fax: [phone]  Follow Up Time:

## 2024-12-13 NOTE — ASU DISCHARGE PLAN (ADULT/PEDIATRIC) - ASU DC SPECIAL INSTRUCTIONSFT
- Nothing in vagina - no intercourse, tampons, or douching until cleared by your doctor.   - Avoid swimming, tub baths, and heavy lifting until cleared by your doctor.   - Showering is ok.   - Continue tylenol 1000mg every 6 hours and motrin 600mg every 6 hours as needed for pain(Ex: motin @ 6am, 12pm and tylenol 9am and 3pm)    - Follow up in office in 1-2 weeks for your postoperative visit.    - Call the office sooner if you develop any fever, saturating 2 pads per hours for > 2 hours, or severe pain not relived with pain medication.  Go to the closest emergency room for any of these symptoms if you are not able to contact your doctor.   -Restart any anticoagulation medication day after surgery - Nothing in vagina - no intercourse, tampons, or douching until cleared by your doctor.   - Avoid swimming, tub baths, and heavy lifting until cleared by your doctor.   - Showering is ok.   - Continue tylenol 1000mg every 6 hours and motrin 600mg every 6 hours as needed for pain(Ex: motin @ 6am, 12pm and tylenol 9am and 3pm)    - Follow up in office in 1-2 weeks for your postoperative visit.    - Call the office sooner if you develop any fever, saturating 2 pads per hours for > 2 hours, or severe pain not relived with pain medication.  Go to the closest emergency room for any of these symptoms if you are not able to contact your doctor.   -Restart any anticoagulation medication 12/14

## 2024-12-13 NOTE — ASU DISCHARGE PLAN (ADULT/PEDIATRIC) - FINANCIAL ASSISTANCE
[facility] provides services at a reduced cost to those who are determined to be eligible through [facility]’s financial assistance program. Information regarding [facility]’s financial assistance program can be found by going to [email or web address] or by calling [phone].

## 2024-12-13 NOTE — ASU DISCHARGE PLAN (ADULT/PEDIATRIC) - NS MD DC FALL RISK RISK
For information on Fall & Injury Prevention, visit: [email or web address] OR  [email or web address] OR  https://www.cdc.gov/steadi/patient.html

## 2024-12-17 ENCOUNTER — NON-APPOINTMENT (OUTPATIENT)
Age: 74
End: 2024-12-17

## 2025-01-15 ENCOUNTER — APPOINTMENT (OUTPATIENT)
Dept: GYNECOLOGIC ONCOLOGY | Facility: CLINIC | Age: 75
End: 2025-01-15
Payer: MEDICARE

## 2025-01-15 VITALS
DIASTOLIC BLOOD PRESSURE: 67 MMHG | OXYGEN SATURATION: 98 % | HEART RATE: 89 BPM | HEIGHT: 63 IN | TEMPERATURE: 98.3 F | SYSTOLIC BLOOD PRESSURE: 110 MMHG | WEIGHT: 114 LBS | BODY MASS INDEX: 20.2 KG/M2

## 2025-01-15 DIAGNOSIS — N84.0 POLYP OF CORPUS UTERI: ICD-10-CM

## 2025-01-15 PROCEDURE — 99213 OFFICE O/P EST LOW 20 MIN: CPT

## 2025-01-15 PROCEDURE — 99459 PELVIC EXAMINATION: CPT

## 2025-04-04 ENCOUNTER — APPOINTMENT (OUTPATIENT)
Dept: OTOLARYNGOLOGY | Facility: CLINIC | Age: 75
End: 2025-04-04
Payer: MEDICARE

## 2025-04-04 VITALS
BODY MASS INDEX: 20.2 KG/M2 | DIASTOLIC BLOOD PRESSURE: 90 MMHG | HEIGHT: 63 IN | OXYGEN SATURATION: 100 % | WEIGHT: 114 LBS | HEART RATE: 91 BPM | SYSTOLIC BLOOD PRESSURE: 176 MMHG | TEMPERATURE: 98.6 F

## 2025-04-04 VITALS
WEIGHT: 114 LBS | BODY MASS INDEX: 20.2 KG/M2 | HEIGHT: 63 IN | DIASTOLIC BLOOD PRESSURE: 93 MMHG | SYSTOLIC BLOOD PRESSURE: 188 MMHG

## 2025-04-04 DIAGNOSIS — K11.7 DISTURBANCES OF SALIVARY SECRETION: ICD-10-CM

## 2025-04-04 DIAGNOSIS — E89.0 POSTPROCEDURAL HYPOTHYROIDISM: ICD-10-CM

## 2025-04-04 DIAGNOSIS — K21.9 GASTRO-ESOPHAGEAL REFLUX DISEASE W/OUT ESOPHAGITIS: ICD-10-CM

## 2025-04-04 DIAGNOSIS — R09.89 OTHER SPECIFIED SYMPTOMS AND SIGNS INVOLVING THE CIRCULATORY AND RESPIRATORY SYSTEMS: ICD-10-CM

## 2025-04-04 DIAGNOSIS — R13.19 OTHER DYSPHAGIA: ICD-10-CM

## 2025-04-04 DIAGNOSIS — Z90.89 OTHER SPECIFIED POSTPROCEDURAL STATES: ICD-10-CM

## 2025-04-04 DIAGNOSIS — H61.21 IMPACTED CERUMEN, RIGHT EAR: ICD-10-CM

## 2025-04-04 DIAGNOSIS — I65.22 OCCLUSION AND STENOSIS OF LEFT CAROTID ARTERY: ICD-10-CM

## 2025-04-04 DIAGNOSIS — H61.22 IMPACTED CERUMEN, LEFT EAR: ICD-10-CM

## 2025-04-04 DIAGNOSIS — Z98.890 OTHER SPECIFIED POSTPROCEDURAL STATES: ICD-10-CM

## 2025-04-04 DIAGNOSIS — R04.0 EPISTAXIS: ICD-10-CM

## 2025-04-04 DIAGNOSIS — I65.21 OCCLUSION AND STENOSIS OF RIGHT CAROTID ARTERY: ICD-10-CM

## 2025-04-04 DIAGNOSIS — C76.0 MALIGNANT NEOPLASM OF HEAD, FACE AND NECK: ICD-10-CM

## 2025-04-04 PROCEDURE — 31575 DIAGNOSTIC LARYNGOSCOPY: CPT

## 2025-04-04 PROCEDURE — G0268 REMOVAL OF IMPACTED WAX MD: CPT

## 2025-04-04 PROCEDURE — 99214 OFFICE O/P EST MOD 30 MIN: CPT | Mod: 25

## 2025-04-05 PROBLEM — I25.10 ATHEROSCLEROTIC HEART DISEASE OF NATIVE CORONARY ARTERY WITHOUT ANGINA PECTORIS: Chronic | Status: ACTIVE | Noted: 2024-12-13

## 2025-04-05 PROBLEM — E03.9 HYPOTHYROIDISM, UNSPECIFIED: Chronic | Status: ACTIVE | Noted: 2024-12-13

## 2025-04-05 PROBLEM — C44.91 BASAL CELL CARCINOMA OF SKIN, UNSPECIFIED: Chronic | Status: ACTIVE | Noted: 2024-12-13

## 2025-04-19 PROBLEM — Z86.79 PERSONAL HISTORY OF OTHER DISEASES OF THE CIRCULATORY SYSTEM: Chronic | Status: ACTIVE | Noted: 2024-12-13

## 2025-04-19 PROBLEM — F98.8 OTHER SPECIFIED BEHAVIORAL AND EMOTIONAL DISORDERS WITH ONSET USUALLY OCCURRING IN CHILDHOOD AND ADOLESCENCE: Chronic | Status: ACTIVE | Noted: 2024-12-13

## 2025-04-19 PROBLEM — C76.0 MALIGNANT NEOPLASM OF HEAD, FACE AND NECK: Chronic | Status: ACTIVE | Noted: 2024-12-13

## 2025-04-28 ENCOUNTER — EMERGENCY (EMERGENCY)
Facility: HOSPITAL | Age: 75
LOS: 1 days | End: 2025-04-28
Attending: EMERGENCY MEDICINE | Admitting: EMERGENCY MEDICINE
Payer: MEDICARE

## 2025-04-28 VITALS — SYSTOLIC BLOOD PRESSURE: 160 MMHG | DIASTOLIC BLOOD PRESSURE: 77 MMHG

## 2025-04-28 VITALS
SYSTOLIC BLOOD PRESSURE: 179 MMHG | OXYGEN SATURATION: 99 % | TEMPERATURE: 98 F | DIASTOLIC BLOOD PRESSURE: 89 MMHG | HEIGHT: 65 IN | WEIGHT: 145.06 LBS | RESPIRATION RATE: 18 BRPM | HEART RATE: 88 BPM

## 2025-04-28 DIAGNOSIS — Z98.890 OTHER SPECIFIED POSTPROCEDURAL STATES: Chronic | ICD-10-CM

## 2025-04-28 DIAGNOSIS — E89.0 POSTPROCEDURAL HYPOTHYROIDISM: Chronic | ICD-10-CM

## 2025-04-28 DIAGNOSIS — I95.9 HYPOTENSION, UNSPECIFIED: ICD-10-CM

## 2025-04-28 DIAGNOSIS — R42 DIZZINESS AND GIDDINESS: ICD-10-CM

## 2025-04-28 LAB
ANION GAP SERPL CALC-SCNC: 8 MMOL/L — LOW (ref 9–16)
APPEARANCE UR: CLEAR — SIGNIFICANT CHANGE UP
BASOPHILS # BLD AUTO: 0.07 K/UL — SIGNIFICANT CHANGE UP (ref 0–0.2)
BASOPHILS NFR BLD AUTO: 1.1 % — SIGNIFICANT CHANGE UP (ref 0–2)
BILIRUB UR-MCNC: NEGATIVE — SIGNIFICANT CHANGE UP
BUN SERPL-MCNC: 16 MG/DL — SIGNIFICANT CHANGE UP (ref 7–23)
CALCIUM SERPL-MCNC: 9.6 MG/DL — SIGNIFICANT CHANGE UP (ref 8.5–10.5)
CHLORIDE SERPL-SCNC: 96 MMOL/L — SIGNIFICANT CHANGE UP (ref 96–108)
CO2 SERPL-SCNC: 27 MMOL/L — SIGNIFICANT CHANGE UP (ref 22–31)
COLOR SPEC: YELLOW — SIGNIFICANT CHANGE UP
CREAT SERPL-MCNC: 0.71 MG/DL — SIGNIFICANT CHANGE UP (ref 0.5–1.3)
DIFF PNL FLD: NEGATIVE — SIGNIFICANT CHANGE UP
EGFR: 88 ML/MIN/1.73M2 — SIGNIFICANT CHANGE UP
EGFR: 88 ML/MIN/1.73M2 — SIGNIFICANT CHANGE UP
EOSINOPHIL # BLD AUTO: 0.17 K/UL — SIGNIFICANT CHANGE UP (ref 0–0.5)
EOSINOPHIL NFR BLD AUTO: 2.6 % — SIGNIFICANT CHANGE UP (ref 0–6)
GLUCOSE SERPL-MCNC: 95 MG/DL — SIGNIFICANT CHANGE UP (ref 70–99)
GLUCOSE UR QL: NEGATIVE MG/DL — SIGNIFICANT CHANGE UP
HCT VFR BLD CALC: 44.8 % — SIGNIFICANT CHANGE UP (ref 34.5–45)
HGB BLD-MCNC: 15.1 G/DL — SIGNIFICANT CHANGE UP (ref 11.5–15.5)
IMM GRANULOCYTES # BLD AUTO: 0.02 K/UL — SIGNIFICANT CHANGE UP (ref 0–0.07)
IMM GRANULOCYTES NFR BLD AUTO: 0.3 % — SIGNIFICANT CHANGE UP (ref 0–0.9)
KETONES UR-MCNC: NEGATIVE MG/DL — SIGNIFICANT CHANGE UP
LEUKOCYTE ESTERASE UR-ACNC: ABNORMAL
LYMPHOCYTES # BLD AUTO: 1.85 K/UL — SIGNIFICANT CHANGE UP (ref 1–3.3)
LYMPHOCYTES NFR BLD AUTO: 28 % — SIGNIFICANT CHANGE UP (ref 13–44)
MCHC RBC-ENTMCNC: 31.3 PG — SIGNIFICANT CHANGE UP (ref 27–34)
MCHC RBC-ENTMCNC: 33.7 G/DL — SIGNIFICANT CHANGE UP (ref 32–36)
MCV RBC AUTO: 92.8 FL — SIGNIFICANT CHANGE UP (ref 80–100)
MONOCYTES # BLD AUTO: 1.06 K/UL — HIGH (ref 0–0.9)
MONOCYTES NFR BLD AUTO: 16.1 % — HIGH (ref 2–14)
NEUTROPHILS # BLD AUTO: 3.43 K/UL — SIGNIFICANT CHANGE UP (ref 1.8–7.4)
NEUTROPHILS NFR BLD AUTO: 51.9 % — SIGNIFICANT CHANGE UP (ref 43–77)
NITRITE UR-MCNC: POSITIVE
NRBC # BLD AUTO: 0 K/UL — SIGNIFICANT CHANGE UP (ref 0–0)
NRBC # FLD: 0 K/UL — SIGNIFICANT CHANGE UP (ref 0–0)
NRBC BLD AUTO-RTO: 0 /100 WBCS — SIGNIFICANT CHANGE UP (ref 0–0)
NT-PROBNP SERPL-SCNC: 490 PG/ML — HIGH
PH UR: 7.5 — SIGNIFICANT CHANGE UP (ref 5–8)
PLATELET # BLD AUTO: 305 K/UL — SIGNIFICANT CHANGE UP (ref 150–400)
PMV BLD: 9.7 FL — SIGNIFICANT CHANGE UP (ref 7–13)
POTASSIUM SERPL-MCNC: 4.1 MMOL/L — SIGNIFICANT CHANGE UP (ref 3.5–5.3)
POTASSIUM SERPL-SCNC: 4.1 MMOL/L — SIGNIFICANT CHANGE UP (ref 3.5–5.3)
PROT UR-MCNC: NEGATIVE MG/DL — SIGNIFICANT CHANGE UP
RBC # BLD: 4.83 M/UL — SIGNIFICANT CHANGE UP (ref 3.8–5.2)
RBC # FLD: 13.5 % — SIGNIFICANT CHANGE UP (ref 10.3–14.5)
SODIUM SERPL-SCNC: 131 MMOL/L — LOW (ref 132–145)
SP GR SPEC: 1.01 — SIGNIFICANT CHANGE UP (ref 1–1.03)
TROPONIN I, HIGH SENSITIVITY RESULT: 15 NG/L — SIGNIFICANT CHANGE UP
UROBILINOGEN FLD QL: 0.2 MG/DL — SIGNIFICANT CHANGE UP (ref 0.2–1)
WBC # BLD: 6.6 K/UL — SIGNIFICANT CHANGE UP (ref 3.8–10.5)
WBC # FLD AUTO: 6.6 K/UL — SIGNIFICANT CHANGE UP (ref 3.8–10.5)

## 2025-04-28 PROCEDURE — 99284 EMERGENCY DEPT VISIT MOD MDM: CPT | Mod: FS

## 2025-04-28 PROCEDURE — 70450 CT HEAD/BRAIN W/O DYE: CPT | Mod: 26

## 2025-04-28 RX ORDER — CEFUROXIME SODIUM 1.5 G
1 VIAL (EA) INJECTION
Qty: 10 | Refills: 0
Start: 2025-04-28 | End: 2025-05-02

## 2025-04-28 RX ORDER — DIAZEPAM 2 MG/1
5 TABLET ORAL ONCE
Refills: 0 | Status: DISCONTINUED | OUTPATIENT
Start: 2025-04-28 | End: 2025-04-28

## 2025-04-28 RX ORDER — DIAZEPAM 2 MG/1
1 TABLET ORAL
Qty: 5 | Refills: 0
Start: 2025-04-28 | End: 2025-05-02

## 2025-04-28 RX ADMIN — DIAZEPAM 5 MILLIGRAM(S): 2 TABLET ORAL at 17:26

## 2025-04-28 NOTE — ED PROVIDER NOTE - CLINICAL SUMMARY MEDICAL DECISION MAKING FREE TEXT BOX
patient presents today complaining of elevated blood pressure with associated dizziness after having a very stressful conversation about finances with her daughter.  States this is a typical trigger for her.  She takes 12.5 milligrams of hydrochlorothiazide but took a second tablet today but her blood pressure continues to be elevated.  Patient is previously taking Valium for anxiety and notes that her doctor wanted to start on something for her anxiety but she does not remember what it was called.  Patient denied any urinary symptoms initially but when discussing the results of her UA she admits to some difficulty starting her stream over the last week and some cloudiness.  Patient was also informed of her borderline low sodium and she states that she is on a restricted sodium diet for her blood pressure.  She will follow-up with PMD.

## 2025-04-28 NOTE — ED PROVIDER NOTE - OTHER FINDINGS
Is the patient currently in the state of [place]? YES    Visit mode:VIDEO    If the visit is dropped, the patient can be reconnected by: VIDEO VISIT: Text to cell phone:   Telephone Information:   Mobile [phone]       Will anyone else be joining the visit? NO  (If patient encounters technical issues they should call [phone])    How would you like to obtain your AVS? MyChart    Are changes needed to the allergy or medication list? No    Reason for visit: RECHECK and Video Visit    Kiki BYRD       LAE (stable from previous on mychart on pts laptop at bedside)

## 2025-04-28 NOTE — ED PROVIDER NOTE - OBJECTIVE STATEMENT
76yo F with h/o HTN on hctz 12.5mg, started on valsartan 2 weeks ago but self d/c 2/2 hypotension. seen at [facility] ER and d/c without any changes and instructed to see her PMD/cards. she saw her cardiologist and they told her it was okay and she should just continue to hctz. she checked her BP several times today and it was elevated with accompanying dizziness and tingling to BUE today only. today she took an extra 12.5mg (total 25mg today) as well as 6 tabs 81mg aspirin and 2 puffs of marijuana without relief. denies cp, sob, palpitations, vision changes, nausea, vomiting, diarrhea, syncope, lower extremity edema. known CAD with carotid artery stenosis.     Cardio: Dr Irma Matt ([facility]) 74yo F with h/o HTN on hctz 12.5mg, started on valsartan 2 weeks ago but self d/c 2/2 hypotension. seen at [facility] ER and d/c without any changes and instructed to see her PMD/cards. she saw her cardiologist and they told her it was okay and she should just continue to hctz. she checked her BP several times today and it was elevated with accompanying dizziness and tingling to BUE today only. todayAfter a particularly stressful conversation with her daughter she noticed her blood pressure had elevated, so she took an extra 12.5mg (total 25mg today) as well as 6 tabs 81mg aspirin and 2 puffs of marijuana without relief. denies cp, sob, palpitations, vision changes, nausea, vomiting, diarrhea, syncope, lower extremity edema. known CAD with carotid artery stenosis.     Cardio: Dr Irma Matt ([facility])

## 2025-04-28 NOTE — ED ADULT NURSE NOTE - OBJECTIVE STATEMENT
Presents to ED c/o dizziness x 1 month. Reports palpitations this morning, was heading out to urgent care but chest pain developed. Reports took 6 baby aspirin prior to calling 911.   Hx : HTN and reports has  100% blockage to right carotid and now with 50% to left side (doctors are monitoring it, no medical intervention done only holistic). Pt states took HCTZ at 2pm today. Pt arrives with /89, GCS 15.

## 2025-04-28 NOTE — ED ADULT NURSE NOTE - NSFALLUNIVINTERV_ED_ALL_ED
Bed/Stretcher in lowest position, wheels locked, appropriate side rails in place/Call bell, personal items and telephone in reach/Instruct patient to call for assistance before getting out of bed/chair/stretcher/Non-slip footwear applied when patient is off stretcher/[place] to call system/Physically safe environment - no spills, clutter or unnecessary equipment/Purposeful proactive rounding/Room/bathroom lighting operational, light cord in reach

## 2025-04-28 NOTE — ED PROVIDER NOTE - PATIENT PORTAL LINK FT
You can access the FollowMyHealth Patient Portal offered by [facility] by registering at the following website: [email or web address]. By joining [facility]’s FollowMyHealth portal, you will also be able to view your health information using other applications (apps) compatible with our system.

## 2025-04-28 NOTE — ED PROVIDER NOTE - ATTENDING APP SHARED VISIT CONTRIBUTION OF CARE
75-year-old female with hypertensive urgency that was triggered by stressful conversation, BP gradually reduced in the ED, labs unremarkable.  No neurological symptoms.  Labs noted with UTI, otherwise unremarkable, stable for DC home with outpatient follow-up.

## 2025-04-28 NOTE — ED ADULT NURSE NOTE - CHIEF COMPLAINT QUOTE
pt c/o dizziness x 1 month hx htn, arrives with bp 179/89 befast negative seen in osh [facility] 4/22 for same, cleared and dc'd with instructions to follow with cardiologist and pmd

## 2025-04-28 NOTE — ED ADULT TRIAGE NOTE - CHIEF COMPLAINT QUOTE
pt c/o dizziness x 1 month hx htn, arrives with bp 179/89 befast negative seen in osh [facility] 4/22 for same, cleared and dc'd with instructions to follow with cardiologist and pmd

## 2025-04-28 NOTE — ED PROVIDER NOTE - NSFOLLOWUPINSTRUCTIONS_ED_ALL_ED_FT
Urinary Tract Infection    A urinary tract infection (UTI) is an infection of any part of the urinary tract, which includes the kidneys, ureters, bladder, and urethra. Risk factors include ignoring your need to urinate, wiping back to front if female, being an uncircumcised male, and having diabetes or a weak immune system. Symptoms include frequent urination, pain or burning with urination, foul smelling urine, cloudy urine, pain in the lower abdomen, blood in the urine, and fever. If you were prescribed an antibiotic medicine, take it as told by your health care provider. Do not stop taking the antibiotic even if you start to feel better.    SEEK IMMEDIATE MEDICAL CARE IF YOU HAVE ANY OF THE FOLLOWING SYMPTOMS: severe back or abdominal pain, fever, inability to keep fluids or medicine down, dizziness/lightheadedness, or a change in mental status.     Hypertension    WHAT YOU NEED TO KNOW:    Hypertension is high blood pressure. Your blood pressure is the force of your blood moving against the walls of your arteries. Hypertension causes your blood pressure to get so high that your heart has to work much harder than normal. This can damage your heart. The cause of hypertension may not be known. This is called essential or primary hypertension. Hypertension caused by another medical condition, such as kidney disease, is called secondary hypertension.    DISCHARGE INSTRUCTIONS:    Call 911 for any of the following:     You have chest pain.      You have any of the following signs of a heart attack:   Squeezing, pressure, or pain in your chest       and any of the following:   Discomfort or pain in your back, neck, jaw, stomach, or arm       Shortness of breath      Nausea or vomiting      Lightheadedness or a sudden cold sweat      You become confused or have difficulty speaking.      You suddenly feel lightheaded or have trouble breathing.    Return to the emergency department if:     You have a severe headache or vision loss.      You have weakness in an arm or leg.     Contact your healthcare provider if:     You feel faint, dizzy, confused, or drowsy.       You have been taking your blood pressure medicine but your pressure is higher than your provider says it should be.      You have questions or concerns about your condition or care.    Medicines: You may need any of the following:     Antihypertensives may be used to help lower your blood pressure. Several kinds of medicines are available. Your healthcare provider will choose medicines based on the kind of hypertension you have. You may need more than one type of medicine. Take the medicine exactly as directed.       Diuretics help decrease extra fluid that collects in your body. This will help lower your blood pressure. You may urinate more often while you take this medicine.      Cholesterol medicine helps lower your cholesterol level. A low cholesterol level helps prevent heart disease and makes it easier to control your blood pressure.       Take your medicine as directed. Contact your healthcare provider if you think your medicine is not helping or if you have side effects. Tell him or her if you are allergic to any medicine. Keep a list of the medicines, vitamins, and herbs you take. Include the amounts, and when and why you take them. Bring the list or the pill bottles to follow-up visits. Carry your medicine list with you in case of an emergency.    Follow up with your healthcare provider as directed: You will need to return to have your blood pressure checked and to have other lab tests done. Write down your questions so you remember to ask them during your visits.     Stages of hypertension: Blood Pressure Readings         Normal blood pressure is 119/79 or lower. Your healthcare provider may only check your blood pressure each year if it stays at a normal level.      Elevated blood pressure is 120/79 to 129/79. This is sometimes called prehypertension. Your healthcare provider may suggest lifestyle changes to help lower your blood pressure to a normal level. He or she may then check it again in 3 to 6 months.      Stage 1 hypertension is 130/80 to 139/89. Your provider may recommend lifestyle changes, medication, and checks every 3 to 6 months until your blood pressure is controlled.      Stage 2 hypertension is 140/90 or higher. Your provider will recommend lifestyle changes and have you take 2 kinds of hypertension medicines. You will also need to have your blood pressure checked monthly until it is controlled.    Manage hypertension:     Check your blood pressure at home. Avoid smoking, caffeine, and exercise at least 30 minutes before checking your blood pressure. Sit and rest for 5 minutes before you take your blood pressure. Extend your arm and support it on a flat surface. Your arm should be at the same level as your heart. Follow the directions that came with your blood pressure monitor. Check your blood pressure 2 times, 1 minute apart, before you take your medicine in the morning. Also check your blood pressure before your evening meal. Keep a record of your readings and bring it to your follow-up visits. Ask your healthcare provider what your blood pressure should be. How to take a Blood Pressure           Manage any other health conditions you have. Health conditions such as diabetes can increase your risk for hypertension. Follow your healthcare provider's instructions and take all your medicines as directed.       Ask about all medicines. Certain medicines can increase your blood pressure. Examples include oral birth control pills, decongestants, herbal supplements, and NSAIDs, such as ibuprofen. Your healthcare provider can tell you which medicines are safe for you to take. This includes prescription and over-the-counter medicines.    Lifestyle changes you can make to manage hypertension:     Limit sodium (salt) as directed. Too much sodium can affect your fluid balance. Check labels to find low-sodium or no-salt-added foods. Some low-sodium foods use potassium salts for flavor. Too much potassium can also cause health problems. Your healthcare provider will tell you how much sodium and potassium are safe for you to have in a day. He or she may recommend that you limit sodium to 2,300 mg a day.           Follow the meal plan recommended by your healthcare provider. A dietitian or your provider can give you more information on low-sodium plans or the DASH (Dietary Approaches to Stop Hypertension) eating plan. The DASH plan is low in sodium, unhealthy fats, and total fat. It is high in potassium, calcium, and fiber.            Exercise to maintain a healthy weight. Exercise at least 30 minutes per day, on most days of the week. This will help decrease your blood pressure. Ask your healthcare provider about the best exercise plan for you. Walking for Exercise           Decrease stress. This may help lower your blood pressure. Learn ways to relax, such as deep breathing or listening to music.      Limit alcohol as directed. Alcohol can increase your blood pressure. A drink of alcohol is 12 ounces of beer, 5 ounces of wine, or 1½ ounces of liquor.      Do not smoke. Nicotine and other chemicals in cigarettes and cigars can increase your blood pressure and also cause lung damage. Ask your healthcare provider for information if you currently smoke and need help to quit. E-cigarettes or smokeless tobacco still contain nicotine. Talk to your healthcare provider before you use these products.

## 2025-04-28 NOTE — ED ADULT NURSE NOTE - ALCOHOL PRE SCREEN (AUDIT - C)
Date:  10/25/2019    Medication:  albuterol (PROVENTIL HFA) 108 (90 Base) MCG/ACT inhaler    Concerns: None    Pharmacy: [facility]        Allow 72 hours for  - confirmed    Last Appointment:       [] Patient completely out of medication        
Dr Mayberry  Prescription and pharmacy loaded, please verify. Last [facility]CE- 3/6/19  
Statement Selected

## 2025-04-30 DIAGNOSIS — Z79.899 OTHER LONG TERM (CURRENT) DRUG THERAPY: ICD-10-CM

## 2025-04-30 DIAGNOSIS — I10 ESSENTIAL (PRIMARY) HYPERTENSION: ICD-10-CM

## 2025-06-03 ENCOUNTER — NON-APPOINTMENT (OUTPATIENT)
Age: 75
End: 2025-06-03

## 2025-06-03 ENCOUNTER — APPOINTMENT (OUTPATIENT)
Dept: GASTROENTEROLOGY | Facility: CLINIC | Age: 75
End: 2025-06-03

## 2025-06-03 VITALS
DIASTOLIC BLOOD PRESSURE: 60 MMHG | TEMPERATURE: 97.5 F | RESPIRATION RATE: 16 BRPM | SYSTOLIC BLOOD PRESSURE: 110 MMHG | HEIGHT: 63 IN | OXYGEN SATURATION: 97 % | HEART RATE: 106 BPM | BODY MASS INDEX: 20.02 KG/M2 | WEIGHT: 113 LBS

## 2025-06-03 DIAGNOSIS — R13.19 OTHER DYSPHAGIA: ICD-10-CM

## 2025-06-03 DIAGNOSIS — K21.9 GASTRO-ESOPHAGEAL REFLUX DISEASE W/OUT ESOPHAGITIS: ICD-10-CM

## 2025-06-03 PROCEDURE — 99204 OFFICE O/P NEW MOD 45 MIN: CPT | Mod: GC

## 2025-06-26 ENCOUNTER — TRANSCRIPTION ENCOUNTER (OUTPATIENT)
Age: 75
End: 2025-06-26

## 2025-06-27 ENCOUNTER — OUTPATIENT (OUTPATIENT)
Dept: OUTPATIENT SERVICES | Facility: HOSPITAL | Age: 75
LOS: 1 days | End: 2025-06-27

## 2025-06-27 ENCOUNTER — RESULT REVIEW (OUTPATIENT)
Age: 75
End: 2025-06-27

## 2025-06-27 ENCOUNTER — APPOINTMENT (OUTPATIENT)
Dept: RADIOLOGY | Facility: HOSPITAL | Age: 75
End: 2025-06-27
Payer: MEDICARE

## 2025-06-27 DIAGNOSIS — Z98.890 OTHER SPECIFIED POSTPROCEDURAL STATES: Chronic | ICD-10-CM

## 2025-06-27 DIAGNOSIS — E89.0 POSTPROCEDURAL HYPOTHYROIDISM: Chronic | ICD-10-CM

## 2025-06-27 PROCEDURE — 74220 X-RAY XM ESOPHAGUS 1CNTRST: CPT | Mod: 26

## 2025-06-27 PROCEDURE — 74220 X-RAY XM ESOPHAGUS 1CNTRST: CPT

## 2025-06-30 ENCOUNTER — TRANSCRIPTION ENCOUNTER (OUTPATIENT)
Age: 75
End: 2025-06-30

## 2025-07-30 ENCOUNTER — APPOINTMENT (OUTPATIENT)
Dept: GASTROENTEROLOGY | Facility: HOSPITAL | Age: 75
End: 2025-07-30

## (undated) DEVICE — SOL INJ NS 0.9% 1000ML

## (undated) DEVICE — AVETA CORAL HYSTEROSCOPE 4.6MM DISP

## (undated) DEVICE — ACCESSORY AVETA WASTE MANAGEMENT 3MM

## (undated) DEVICE — GLV 6.5 PROTEXIS (WHITE)

## (undated) DEVICE — WARMING BLANKET UPPER ADULT

## (undated) DEVICE — AVETA FLUID MANAGEMENT ACCESSORY

## (undated) DEVICE — PACK D&C

## (undated) DEVICE — GLV 6 PROTEXIS (WHITE)

## (undated) DEVICE — GLV 7.5 PROTEXIS (WHITE)

## (undated) DEVICE — TUBING SET GRAVITY 2 SPIKE

## (undated) DEVICE — DRSG PAD SANITARY OB

## (undated) DEVICE — GOWN XXL

## (undated) DEVICE — POSITIONER STRAP ARMBOARD 1.5X32" DISP

## (undated) DEVICE — VENODYNE/SCD SLEEVE CALF MEDIUM

## (undated) DEVICE — POSITIONER FOAM EGG CRATE ULNAR 2PCS (PINK)

## (undated) DEVICE — PRESSURE INFUSOR BAG 1000ML